# Patient Record
Sex: FEMALE | Race: ASIAN | NOT HISPANIC OR LATINO
[De-identification: names, ages, dates, MRNs, and addresses within clinical notes are randomized per-mention and may not be internally consistent; named-entity substitution may affect disease eponyms.]

---

## 2022-06-28 PROBLEM — Z00.00 ENCOUNTER FOR PREVENTIVE HEALTH EXAMINATION: Status: ACTIVE | Noted: 2022-06-28

## 2022-06-30 ENCOUNTER — APPOINTMENT (OUTPATIENT)
Dept: VASCULAR SURGERY | Facility: CLINIC | Age: 72
End: 2022-06-30

## 2022-06-30 VITALS
BODY MASS INDEX: 20.42 KG/M2 | WEIGHT: 104 LBS | HEART RATE: 70 BPM | HEIGHT: 60 IN | DIASTOLIC BLOOD PRESSURE: 74 MMHG | SYSTOLIC BLOOD PRESSURE: 150 MMHG

## 2022-06-30 PROCEDURE — 99203 OFFICE O/P NEW LOW 30 MIN: CPT

## 2022-06-30 PROCEDURE — 93970 EXTREMITY STUDY: CPT

## 2022-07-01 NOTE — HISTORY OF PRESENT ILLNESS
[FreeTextEntry1] : 73 yo female with history of hld presents for evaluation of skin changes in b/l lower extremities that has been present for a few months and progressively getting worse and moving more proximally up the leg.  pt denies any history of dvt, ulcers or bleeding.  pt denies any pain or swelling of the lower extremities.  pt was on asa but had stopped it thinking it may have been adding to pt skin changes

## 2022-07-01 NOTE — PHYSICAL EXAM
[2+] : left 2+ [] : bilaterally [Ankle Swelling On The Left] : moderate [Alert] : alert [Calm] : calm [JVD] : no jugular venous distention  [Normal Breath Sounds] : Normal breath sounds [Ankle Swelling (On Exam)] : not present [Varicose Veins Of Lower Extremities] : not present [Skin Ulcer] : no ulcer [de-identified] : appears well  [de-identified] : brawny skin discoloration over the lateral malleolus of b/l lower extremities with some patches of discoloration over the anterior medial proximal calf

## 2022-07-01 NOTE — ASSESSMENT
[FreeTextEntry1] : 73 yo female with history of hld presents for evaluation of skin changes in b/l lower extremities that has been present for a few months and progressively getting worse and moving more proximally up the leg.\par \par venous duplex shows insufficiency in the right calf only, no dvt/svt or insufficiency in the left lower extremity \par \par would recommend pt be evaluated by dermatology for further workup given skin discoloration is b/l and worse on the left lower extremity \par no vascular surgical intervention

## 2023-05-10 ENCOUNTER — RESULT REVIEW (OUTPATIENT)
Age: 73
End: 2023-05-10

## 2024-03-20 ENCOUNTER — EMERGENCY (EMERGENCY)
Facility: HOSPITAL | Age: 74
LOS: 1 days | Discharge: HOME | End: 2024-03-20
Attending: EMERGENCY MEDICINE
Payer: MEDICAID

## 2024-03-20 VITALS
HEIGHT: 60 IN | WEIGHT: 100.09 LBS | TEMPERATURE: 98 F | HEART RATE: 88 BPM | RESPIRATION RATE: 16 BRPM | SYSTOLIC BLOOD PRESSURE: 138 MMHG | DIASTOLIC BLOOD PRESSURE: 70 MMHG | OXYGEN SATURATION: 99 %

## 2024-03-20 LAB
ADD ON TEST-SPECIMEN IN LAB: SIGNIFICANT CHANGE UP
ALBUMIN SERPL ELPH-MCNC: 4 G/DL — SIGNIFICANT CHANGE UP (ref 3.3–5)
ALP SERPL-CCNC: 51 U/L — SIGNIFICANT CHANGE UP (ref 40–120)
ALT FLD-CCNC: 23 U/L — SIGNIFICANT CHANGE UP (ref 10–45)
ANION GAP SERPL CALC-SCNC: 13 MMOL/L — SIGNIFICANT CHANGE UP (ref 5–17)
APTT BLD: 29.7 SEC — SIGNIFICANT CHANGE UP (ref 24.5–35.6)
AST SERPL-CCNC: 48 U/L — HIGH (ref 10–40)
BASOPHILS # BLD AUTO: 0.03 K/UL — SIGNIFICANT CHANGE UP (ref 0–0.2)
BASOPHILS NFR BLD AUTO: 0.6 % — SIGNIFICANT CHANGE UP (ref 0–2)
BILIRUB SERPL-MCNC: 0.5 MG/DL — SIGNIFICANT CHANGE UP (ref 0.2–1.2)
BUN SERPL-MCNC: 16 MG/DL — SIGNIFICANT CHANGE UP (ref 7–23)
CALCIUM SERPL-MCNC: 9 MG/DL — SIGNIFICANT CHANGE UP (ref 8.4–10.5)
CHLORIDE SERPL-SCNC: 102 MMOL/L — SIGNIFICANT CHANGE UP (ref 96–108)
CO2 SERPL-SCNC: 23 MMOL/L — SIGNIFICANT CHANGE UP (ref 22–31)
CREAT SERPL-MCNC: 0.62 MG/DL — SIGNIFICANT CHANGE UP (ref 0.5–1.3)
EGFR: 92 ML/MIN/1.73M2 — SIGNIFICANT CHANGE UP
EOSINOPHIL # BLD AUTO: 0.06 K/UL — SIGNIFICANT CHANGE UP (ref 0–0.5)
EOSINOPHIL NFR BLD AUTO: 1.1 % — SIGNIFICANT CHANGE UP (ref 0–6)
GLUCOSE SERPL-MCNC: 129 MG/DL — HIGH (ref 70–99)
HCT VFR BLD CALC: 35.9 % — SIGNIFICANT CHANGE UP (ref 34.5–45)
HGB BLD-MCNC: 11.7 G/DL — SIGNIFICANT CHANGE UP (ref 11.5–15.5)
IMM GRANULOCYTES NFR BLD AUTO: 0.2 % — SIGNIFICANT CHANGE UP (ref 0–0.9)
INR BLD: 0.94 RATIO — SIGNIFICANT CHANGE UP (ref 0.85–1.18)
LYMPHOCYTES # BLD AUTO: 1.43 K/UL — SIGNIFICANT CHANGE UP (ref 1–3.3)
LYMPHOCYTES # BLD AUTO: 27.2 % — SIGNIFICANT CHANGE UP (ref 13–44)
MCHC RBC-ENTMCNC: 29 PG — SIGNIFICANT CHANGE UP (ref 27–34)
MCHC RBC-ENTMCNC: 32.6 GM/DL — SIGNIFICANT CHANGE UP (ref 32–36)
MCV RBC AUTO: 89.1 FL — SIGNIFICANT CHANGE UP (ref 80–100)
MONOCYTES # BLD AUTO: 0.41 K/UL — SIGNIFICANT CHANGE UP (ref 0–0.9)
MONOCYTES NFR BLD AUTO: 7.8 % — SIGNIFICANT CHANGE UP (ref 2–14)
NEUTROPHILS # BLD AUTO: 3.31 K/UL — SIGNIFICANT CHANGE UP (ref 1.8–7.4)
NEUTROPHILS NFR BLD AUTO: 63.1 % — SIGNIFICANT CHANGE UP (ref 43–77)
NRBC # BLD: 0 /100 WBCS — SIGNIFICANT CHANGE UP (ref 0–0)
NT-PROBNP SERPL-SCNC: 299 PG/ML — SIGNIFICANT CHANGE UP (ref 0–300)
PLATELET # BLD AUTO: 200 K/UL — SIGNIFICANT CHANGE UP (ref 150–400)
POTASSIUM SERPL-MCNC: 4.2 MMOL/L — SIGNIFICANT CHANGE UP (ref 3.5–5.3)
POTASSIUM SERPL-SCNC: 4.2 MMOL/L — SIGNIFICANT CHANGE UP (ref 3.5–5.3)
PROT SERPL-MCNC: 7.4 G/DL — SIGNIFICANT CHANGE UP (ref 6–8.3)
PROTHROM AB SERPL-ACNC: 9.9 SEC — SIGNIFICANT CHANGE UP (ref 9.5–13)
RBC # BLD: 4.03 M/UL — SIGNIFICANT CHANGE UP (ref 3.8–5.2)
RBC # FLD: 12.9 % — SIGNIFICANT CHANGE UP (ref 10.3–14.5)
SODIUM SERPL-SCNC: 138 MMOL/L — SIGNIFICANT CHANGE UP (ref 135–145)
TROPONIN T, HIGH SENSITIVITY RESULT: <6 NG/L — SIGNIFICANT CHANGE UP (ref 0–51)
WBC # BLD: 5.25 K/UL — SIGNIFICANT CHANGE UP (ref 3.8–10.5)
WBC # FLD AUTO: 5.25 K/UL — SIGNIFICANT CHANGE UP (ref 3.8–10.5)

## 2024-03-20 PROCEDURE — 99223 1ST HOSP IP/OBS HIGH 75: CPT

## 2024-03-20 PROCEDURE — 71046 X-RAY EXAM CHEST 2 VIEWS: CPT | Mod: 26

## 2024-03-20 PROCEDURE — 75574 CT ANGIO HRT W/3D IMAGE: CPT | Mod: 26,MC

## 2024-03-20 RX ORDER — ASPIRIN/CALCIUM CARB/MAGNESIUM 324 MG
324 TABLET ORAL ONCE
Refills: 0 | Status: COMPLETED | OUTPATIENT
Start: 2024-03-20 | End: 2024-03-20

## 2024-03-20 RX ORDER — METOPROLOL TARTRATE 50 MG
100 TABLET ORAL ONCE
Refills: 0 | Status: COMPLETED | OUTPATIENT
Start: 2024-03-20 | End: 2024-03-20

## 2024-03-20 RX ADMIN — Medication 324 MILLIGRAM(S): at 11:38

## 2024-03-20 RX ADMIN — Medication 100 MILLIGRAM(S): at 13:10

## 2024-03-20 NOTE — ED CDU PROVIDER INITIAL DAY NOTE - PROGRESS NOTE DETAILS
Patient well-appearing, in no acute distress without any acute complaints.  Spoke to patient using Mandarin  ID number [id number]. Advised of plan for echo in the morning and possible cardiology evaluation.  Patient understanding of plan and is in agreement with plan.  Will continue to monitor on telemetry  Paulina Kaye PA-C

## 2024-03-20 NOTE — ED CDU PROVIDER INITIAL DAY NOTE - OBJECTIVE STATEMENT
74-year-old history of hyperlipidemia accompanied by her daughter who translates at bedside per the family request presenting to the emergency department for the evaluation of 2 days of constant palpitations.  This morning however when she was walking around at around 930 she developed a sharp stabbing left-sided chest discomfort radiating to the left shoulder.  She took her 's nitro and aspirin with mild relief. pt currently feels well, no chest pain. no exertional component.  No associated shortness of breath, diaphoresis, nausea, vomiting.  No recent fever, chills, cough or illness.  No lower extremity edema, recent travel or recent immobilization.  Pain is nonpleuritic in nature.  She reports she did have an exercise stress test (told normal) and holter monitor in September 2023.

## 2024-03-20 NOTE — ED PROVIDER NOTE - OBJECTIVE STATEMENT
74-year-old history of hyperlipidemia accompanied by her daughter who translates at bedside per the family request presenting to the emergency department for the evaluation of 2 days of constant palpitations.  This morning however when she was walking around at around 930 she developed a sharp stabbing left-sided chest discomfort radiating to the left shoulder.  She took her 's nitro with mild relief.  Did not take aspirin.  She reports the pain is improved but not gone at this time.  No associated shortness of breath, diaphoresis, nausea, vomiting.  No recent fever, chills, cough or illness.  No lower extremity edema, recent travel or recent immobilization.  Pain is nonpleuritic in nature.  She reports she did have a stress test in September 2023 does not know the name of her cardiologist.

## 2024-03-20 NOTE — ED CDU PROVIDER DISPOSITION NOTE - NSFOLLOWUPINSTRUCTIONS_ED_ALL_ED_FT
1. Please follow up with your Primary Care Doctor after discharge, bring a copy of your results to follow up appointment for review     2. Please rest, stay hydrated and continue all at home medications as previously prescribed    3. Recommend follow up with your Cardiologist after discharge for reevaluation and continued management. Please call office to schedule appointment    4. Return to ED for any new or worsened symptoms of concern 1. Stay hydrated.  2. Continue your current home medications. Start taking Aspirin 81mg daily.  3. Follow-up with your medical doctor in 2-3 days. Follow up with Cardiologist Dr. Green next week. Call for appointment.  Bring your test results with you for follow-up.  4. Return to the ER if you have any worsening symptoms, chest pain, difficulty breathing, fevers, chills, weakness, or any other concerns.    Sung Green  Cardiovascular Disease  [address] [place] [address], [address]  [place] [address]  Phone: [phone]

## 2024-03-20 NOTE — ED CDU PROVIDER DISPOSITION NOTE - ATTENDING APP SHARED VISIT CONTRIBUTION OF CARE
Attending note (James): 75y/o F w/ h/o HLD who presented yesterday to the ED for palpitations and chest discomfort. Improving since arrival; remains hemodynamically stable; In ED; ECG nonischemic; CXR no acute findings; labs without actionable findings/ troponin <6; sent to CDU for observation and further cardiac evaluation.  Her cardiac ct showed moderate CAD and cardiology was consulted, and patien then had a stress echo that was non-actionable.  After further discussed with Dr Haji (cardiology attending) the patient was discharged home with outpatient followup recommendations.

## 2024-03-20 NOTE — ED CDU PROVIDER DISPOSITION NOTE - PATIENT PORTAL LINK FT
You can access the FollowMyHealth Patient Portal offered by [facility] by registering at the following website: [email or web address]. By joining [employer]’s FollowMyHealth portal, you will also be able to view your health information using other applications (apps) compatible with our system.

## 2024-03-20 NOTE — ED ADULT NURSE NOTE - NURSING ED SKIN COLOR
Information: Selecting Yes will display possible errors in your note based on the variables you have selected. This validation is only offered as a suggestion for you. PLEASE NOTE THAT THE VALIDATION TEXT WILL BE REMOVED WHEN YOU FINALIZE YOUR NOTE. IF YOU WANT TO FAX A PRELIMINARY NOTE YOU WILL NEED TO TOGGLE THIS TO 'NO' IF YOU DO NOT WANT IT IN YOUR FAXED NOTE. normal for race

## 2024-03-20 NOTE — ED CDU PROVIDER INITIAL DAY NOTE - NSICDXFAMHXNEG_GEN_ED
Saturation: Site=PA (Pulmonary Artery) , O2=51.4 %, Hgb=11.2 gm/dl, Condition=Condition 1. Used in calculation. heart disease

## 2024-03-20 NOTE — ED ADULT NURSE NOTE - OBJECTIVE STATEMENT
Patient BIBEMS from home c/o L sided chest pain radiating to L shoulder since this morning at 0945 and palpitations x 2 days. Patient states she took her husbands medication, one nitro sublingual and 81mg aspirin, and felt some relief. Patient was sinus tachycardic with EMS. 18G placed in L wrist by EMS. Patient denies any cardiac hx. Patient denies sob, fever, chills, n/v/d. Patient A&Ox4, ambulatory at baseline. Patient placed on cardiac monitor. Plan of care in progress.

## 2024-03-20 NOTE — ED PROVIDER NOTE - CPE EDP [facility] NORM
Hypertension Preventive measure Hypertension Hypertension Preventive measure Preventive measure Hypertension Hypertension Hypertension Hypertension normal...

## 2024-03-20 NOTE — ED CDU PROVIDER DISPOSITION NOTE - SECONDARY DIAGNOSIS.
Palpitations
[facility]  OB/GYN  [address] [place] [address]  Phone: [phone]  Fax:   Follow Up Time: 1-3 Days

## 2024-03-20 NOTE — ED CDU PROVIDER DISPOSITION NOTE - CLINICAL COURSE
74-year-old history of hyperlipidemia accompanied by her daughter who translates at bedside per the family request presenting to the emergency department for the evaluation of 2 days of constant palpitations.  This morning however when she was walking around at around 930 she developed a sharp stabbing left-sided chest discomfort radiating to the left shoulder.  She took her 's nitro and aspirin with mild relief. pt currently feels well, no chest pain. no exertional component.  No associated shortness of breath, diaphoresis, nausea, vomiting.  No recent fever, chills, cough or illness.  No lower extremity edema, recent travel or recent immobilization.  Pain is nonpleuritic in nature.  She reports she did have an exercise stress test (told normal) and holter monitor in September 2023.  In the ED patient trop <6. Pt accepted to CDU for CTC and TTE. 74-year-old history of hyperlipidemia accompanied by her daughter who translates at bedside per the family request presenting to the emergency department for the evaluation of 2 days of constant palpitations.  This morning however when she was walking around at around 930 she developed a sharp stabbing left-sided chest discomfort radiating to the left shoulder.  She took her 's nitro and aspirin with mild relief. pt currently feels well, no chest pain. no exertional component.  No associated shortness of breath, diaphoresis, nausea, vomiting.  No recent fever, chills, cough or illness.  No lower extremity edema, recent travel or recent immobilization.  Pain is nonpleuritic in nature.  She reports she did have an exercise stress test (told normal) and holter monitor in September 2023.  In the ED patient trop <6. Pt accepted to CDU for CTC and TTE. cardiac ct showed moderate CAD. pt was seen by cardiology and had a stress echo that was non-actionable. pt was discharged home with outpt followup.

## 2024-03-21 ENCOUNTER — RESULT REVIEW (OUTPATIENT)
Age: 74
End: 2024-03-21

## 2024-03-21 VITALS
HEART RATE: 63 BPM | SYSTOLIC BLOOD PRESSURE: 122 MMHG | DIASTOLIC BLOOD PRESSURE: 68 MMHG | RESPIRATION RATE: 17 BRPM | OXYGEN SATURATION: 99 %

## 2024-03-21 LAB
A1C WITH ESTIMATED AVERAGE GLUCOSE RESULT: 5.7 % — HIGH (ref 4–5.6)
CHOLEST SERPL-MCNC: 185 MG/DL — SIGNIFICANT CHANGE UP
ESTIMATED AVERAGE GLUCOSE: 117 MG/DL — HIGH (ref 68–114)
GLUCOSE BLDC GLUCOMTR-MCNC: 127 MG/DL — HIGH (ref 70–99)
HDLC SERPL-MCNC: 74 MG/DL — SIGNIFICANT CHANGE UP
LIPID PNL WITH DIRECT LDL SERPL: 86 MG/DL — SIGNIFICANT CHANGE UP
NON HDL CHOLESTEROL: 112 MG/DL — SIGNIFICANT CHANGE UP
TRIGL SERPL-MCNC: 152 MG/DL — HIGH
TROPONIN T, HIGH SENSITIVITY RESULT: <6 NG/L — SIGNIFICANT CHANGE UP (ref 0–51)

## 2024-03-21 PROCEDURE — 80053 COMPREHEN METABOLIC PANEL: CPT

## 2024-03-21 PROCEDURE — 93306 TTE W/DOPPLER COMPLETE: CPT | Mod: 26

## 2024-03-21 PROCEDURE — 84484 ASSAY OF TROPONIN QUANT: CPT

## 2024-03-21 PROCEDURE — 93017 CV STRESS TEST TRACING ONLY: CPT

## 2024-03-21 PROCEDURE — 83735 ASSAY OF MAGNESIUM: CPT

## 2024-03-21 PROCEDURE — 85730 THROMBOPLASTIN TIME PARTIAL: CPT

## 2024-03-21 PROCEDURE — 80061 LIPID PANEL: CPT

## 2024-03-21 PROCEDURE — C8929: CPT

## 2024-03-21 PROCEDURE — 85610 PROTHROMBIN TIME: CPT

## 2024-03-21 PROCEDURE — 85025 COMPLETE CBC W/AUTO DIFF WBC: CPT

## 2024-03-21 PROCEDURE — 99238 HOSP IP/OBS DSCHRG MGMT 30/<: CPT

## 2024-03-21 PROCEDURE — 84443 ASSAY THYROID STIM HORMONE: CPT

## 2024-03-21 PROCEDURE — 93005 ELECTROCARDIOGRAM TRACING: CPT | Mod: XU,76

## 2024-03-21 PROCEDURE — C8930: CPT

## 2024-03-21 PROCEDURE — 83880 ASSAY OF NATRIURETIC PEPTIDE: CPT

## 2024-03-21 PROCEDURE — 99285 EMERGENCY DEPT VISIT HI MDM: CPT | Mod: 25

## 2024-03-21 PROCEDURE — 83036 HEMOGLOBIN GLYCOSYLATED A1C: CPT

## 2024-03-21 PROCEDURE — 93351 STRESS TTE COMPLETE: CPT | Mod: 26

## 2024-03-21 PROCEDURE — 75574 CT ANGIO HRT W/3D IMAGE: CPT | Mod: MC

## 2024-03-21 PROCEDURE — 71046 X-RAY EXAM CHEST 2 VIEWS: CPT

## 2024-03-21 PROCEDURE — 36415 COLL VENOUS BLD VENIPUNCTURE: CPT

## 2024-03-21 PROCEDURE — G0378: CPT

## 2024-03-21 PROCEDURE — 82962 GLUCOSE BLOOD TEST: CPT

## 2024-03-21 RX ORDER — SODIUM CHLORIDE 9 MG/ML
500 INJECTION INTRAMUSCULAR; INTRAVENOUS; SUBCUTANEOUS ONCE
Refills: 0 | Status: COMPLETED | OUTPATIENT
Start: 2024-03-21 | End: 2024-03-21

## 2024-03-21 RX ADMIN — SODIUM CHLORIDE 500 MILLILITER(S): 9 INJECTION INTRAMUSCULAR; INTRAVENOUS; SUBCUTANEOUS at 16:30

## 2024-03-21 NOTE — ED ADULT NURSE REASSESSMENT NOTE - NSFALLUNIVINTERV_ED_ALL_ED
Bed/Stretcher in lowest position, wheels locked, appropriate side rails in place/Call bell, personal items and telephone in reach/Instruct patient to call for assistance before getting out of bed/chair/stretcher/Non-slip footwear applied when patient is off stretcher/[place] to call system/Physically safe environment - no spills, clutter or unnecessary equipment/Purposeful proactive rounding/Room/bathroom lighting operational, light cord in reach

## 2024-03-21 NOTE — ED ADULT NURSE REASSESSMENT NOTE - NS ED NURSE REASSESS COMMENT FT1
@1630 pt had an episode of dizziness and unsteady gate, pt wheeled to bathroom. Pt given IV NS bolus 500ML. . Pt on a cardiac monitor in NSR, HR in 60's. VSS documented in flowsheet.    @1700 Pt feeling better. resting in bed. Pt on a cardiac monitor in NSR, HR in 60's.
Pt received from [facility] at 1900. FUNMILAYO Kaye at bedside. Pt is Mardarin speaking,  used. Pt oriented to CDU and plan of care was discussed. Pt is observed for CP and palpitations, cardiac monitoring on tele, pending TTE. A&Ox4, obeys commands, ambulatory, independent. Denies HA, dizziness, blurry vision. Respirations spontaneous and unlabored. Denies SOB, dyspnea, cough, CP, palpitations at this time. On CM, NSR. 18G R AC and 18 L wrist patent, no signs of infiltration noted. Denies abd pain, N/V/D/C. Pt afebrile, denies chills. Pt resting in bed. Safety & comfort measures maintained. Call bell within reach.
Pt received from [facility]. Spoke with Mandarin  Ryan #[id number]. Pt oriented to CDU & plan of care was discussed. Pt A&O x 4. Pt in CDU for frequent reeval, vitals per routine, tele, echo. Pt denies any chest pain, SOB, dizziness or palpitations as of now. Pt on a cardiac monitor in NSR, HR in 60's. V/S stable, pt afebrile,  IV in place, patent and free of signs of infiltration. Pt resting in bed. Safety & comfort measures maintained. Call bell in reach. Will continue to monitor.

## 2024-03-21 NOTE — CONSULT NOTE ADULT - SUBJECTIVE AND OBJECTIVE BOX
DATE OF SERVICE: 03-21-24 @ 21:59    CHIEF COMPLAINT:Patient is a 74y old  Female who presents with a chief complaint of     HISTORY OF PRESENT ILLNESS:HPI: 74-year-old history of hyperlipidemia accompanied by her daughter who translates at bedside per the family request presenting to the emergency department for the evaluation of 2 days of constant palpitations.  This morning however when she was walking around at around 930 she developed a sharp stabbing left-sided chest discomfort radiating to the left shoulder.  She took her 's nitro and aspirin with mild relief. pt currently feels well, no chest pain. no exertional component.  No associated shortness of breath, diaphoresis, nausea, vomiting.  No recent fever, chills, cough or illness.  No lower extremity edema, recent travel or recent immobilization.  Pain is nonpleuritic in nature.  She reports she did have an exercise stress test (told normal) and holter monitor in September 2023.      PAST MEDICAL & SURGICAL HISTORY:  HLD (hyperlipidemia)      MEDICATIONS:      FAMILY HISTORY:  No pertinent family history in first degree relatives        Non-contributory    SOCIAL HISTORY:    [ ] not a smoker    Allergies    No Known Allergies    Intolerances    	    REVIEW OF SYSTEMS:  CONSTITUTIONAL: No fever  EYES: No eye pain, visual disturbances, or discharge  ENMT:  No difficulty hearing, tinnitus  NECK: No pain or stiffness  RESPIRATORY: No cough, wheezing,  CARDIOVASCULAR: No chest pain, palpitations, passing out, dizziness, or leg swelling  GASTROINTESTINAL:  No nausea, vomiting, diarrhea or constipation. No melena.  GENITOURINARY: No dysuria, hematuria  NEUROLOGICAL: No stroke like symptoms  SKIN: No burning or lesions   ENDOCRINE: No heat or cold intolerance  MUSCULOSKELETAL: No joint pain or swelling  PSYCHIATRIC: No  anxiety, mood swings  HEME/LYMPH: No bleeding gums  ALLERGY AND IMMUNOLOGIC: No hives or eczema	    All other ROS negative    PHYSICAL EXAM:  T(C): 36.8 (03-21-24 @ 15:29), Max: 36.8 (03-21-24 @ 00:18)  HR: 63 (03-21-24 @ 16:30) (50 - 70)  BP: 122/68 (03-21-24 @ 16:30) (119/68 - 146/50)  RR: 17 (03-21-24 @ 16:30) (17 - 18)  SpO2: 99% (03-21-24 @ 16:30) (95% - 100%)  Wt(kg): --  I&O's Summary      Appearance: Normal	  HEENT:   Normal oral mucosa, EOMI	  Cardiovascular:  S1 S2, No JVD,    Respiratory: Lungs clear to auscultation	  Psychiatry: Alert  Gastrointestinal:  Soft, Non-tender, + BS	  Skin: No rashes   Neurologic: Non-focal  Extremities:  No edema  Vascular: Peripheral pulses palpable    	    	  	  CARDIAC MARKERS:  Labs personally reviewed by me                                  11.7   5.25  )-----------( 200      ( 20 Mar 2024 11:45 )             35.9     03-20    138  |  102  |  16  ----------------------------<  129<H>  4.2   |  23  |  0.62    Ca    9.0      20 Mar 2024 11:45  Mg     2.3     03-20    TPro  7.4  /  Alb  4.0  /  TBili  0.5  /  DBili  x   /  AST  48<H>  /  ALT  23  /  AlkPhos  51  03-20          EKG: Personally reviewed by me - NSR  Radiology: Personally reviewed by me - CXR clear lungs      Assessment /Plan:   74-year-old history of hyperlipidemia accompanied by her daughter who translates at bedside per the family request presenting to the emergency department for the evaluation of 2 days of constant palpitations.  This morning however when she was walking around at around 930 she developed a sharp stabbing left-sided chest discomfort radiating to the left shoulder.  She took her 's nitro and aspirin with mild relief. pt currently feels well, no chest pain. no exertional component.  No associated shortness of breath, diaphoresis, nausea, vomiting.  No recent fever, chills, cough or illness.  No lower extremity edema, recent travel or recent immobilization.  Pain is nonpleuritic in nature.  She reports she did have an exercise stress test (told normal) and holter monitor in September 2023.    1. Chest pain --- atypical chest pain  - Echo unremarkable   - Stress echo with no ischemia   - OP prolonged monitor to r/o occult arrythmia with her cardio        Differential diagnosis and plan of care discussed with patient after the evaluation. Counseling on diet, nutritional counseling, weight management, exercise and medication compliance was done.   Advanced care planning/advanced directives discussed with patient/family. DNR status including forceful chest compressions to attempt to restart the heart, ventilator support/artificial breathing, electric shock, artificial nutrition, health care proxy, Molst form all discussed with pt. Pt wishes to consider. Sixteen minutes spent on discussing advanced directives. Seventy five minutes spent on encounter, of which more than fifty percent of the encounter was spent on counseling and/or coordinating care by the attending physician.        Sung Green DO [facility]  Cardiovascular Medicine  [address] [place] [address], [address]  Office [phone]  Available via call/text on [employer] Teams

## 2024-03-21 NOTE — ED CDU PROVIDER SUBSEQUENT DAY NOTE - PHYSICAL EXAMINATION
CONSTITUTIONAL: Patient is awake, alert and oriented x 3. Patient is well appearing and in no acute distress  HEAD: NCAT  NECK: supple, FROM  LUNGS: CTA b/l, no wheezing or rales   HEART: RRR.+S1S2 no murmurs  ABDOMEN: Soft, non-distended, nttp, no rebound or guarding  EXTREMITY: No edema or calf tenderness b/l, FROM upper and lower ext b/l  SKIN: No rash or lesions  NEURO: No focal deficits

## 2024-03-21 NOTE — ED CDU PROVIDER SUBSEQUENT DAY NOTE - HISTORY
Patient has been resting comfortably and has been without acute complaints while in CDU. No events on tele. Pending TTE in am +/- cardiology eval. Will continue to monitor   Paulina Kaye PA-C

## 2024-03-21 NOTE — ED CDU PROVIDER SUBSEQUENT DAY NOTE - PROGRESS NOTE DETAILS
Pt comfortable. No complaints. Vital signs stable. Will continue to observe and reassess. Awaiting echo and cardiology pt returned from stress echo c/o dizziness and left sided chest pain. EKG repeated, sinus bradycardia, no ST changes. troponin negative. vitals stable. normal saline 500 cc bolus given. discussed with Dr. Ramirez and Dr. Green. awaiting stress echo results. -Cheli Garcia PA-C pt seen by cardiology Dr. Green who recommends a stress echo. discussed with Dr. Ramirez. -Cheli Garcia PA-C Pt comfortable. No complaints. Vital signs stable. orthostatics negative. pt ambulating independently. stress echo non-actionable, no exercise induced ischemia. reviewed results with Dr. Green who states pt can follow up outpt. discussed with Dr. Ramirez, pt stable for d/c home with outpt pmd and cardiology followup. -Cheli Garcia PA-C Pt comfortable. No complaints. Vital signs stable. Will continue to observe and reassess. Awaiting echo and cardiology evaluation.  ID [id number]. -Cheli Garcia PA-C pt returned from stress echo c/o dizziness and left sided chest pain. pts daughter at bedside. EKG repeated, sinus bradycardia, no ST changes. troponin negative. vitals stable. normal saline 500 cc bolus given. discussed with Dr. Ramirez and Dr. Green. awaiting stress echo results. -Cheli Garcia PA-C

## 2024-05-09 NOTE — ED PROVIDER NOTE - NEUROLOGICAL, MLM
awake, alert, following commands Patient requests all Lab, Cardiology, and Radiology Results on their Discharge Instructions